# Patient Record
Sex: FEMALE | Race: WHITE | Employment: OTHER | URBAN - METROPOLITAN AREA
[De-identification: names, ages, dates, MRNs, and addresses within clinical notes are randomized per-mention and may not be internally consistent; named-entity substitution may affect disease eponyms.]

---

## 2017-08-08 PROBLEM — K12.1 MOUTH ULCERS: Status: ACTIVE | Noted: 2017-08-08

## 2017-12-29 PROBLEM — M41.9 KYPHOSCOLIOSIS DEFORMITY OF SPINE: Status: ACTIVE | Noted: 2017-12-29

## 2020-04-14 ENCOUNTER — HOSPITAL ENCOUNTER (OUTPATIENT)
Dept: CT IMAGING | Age: 85
Discharge: HOME | End: 2020-04-14
Attending: OTOLARYNGOLOGY
Payer: MEDICARE

## 2020-04-14 DIAGNOSIS — H74.8X2: ICD-10-CM

## 2020-04-14 DIAGNOSIS — H72.92 PERFORATION OF LEFT TYMPANIC MEMBRANE: ICD-10-CM

## 2020-04-14 LAB — CREAT BLD-MCNC: 1.3 MG/DL (ref 0.8–1.5)

## 2020-04-14 PROCEDURE — 82565 ASSAY OF CREATININE: CPT

## 2020-04-14 PROCEDURE — 74011636320 HC RX REV CODE- 636/320: Performed by: OTOLARYNGOLOGY

## 2020-04-14 PROCEDURE — 74011000258 HC RX REV CODE- 258: Performed by: OTOLARYNGOLOGY

## 2020-04-14 PROCEDURE — 70481 CT ORBIT/EAR/FOSSA W/DYE: CPT

## 2020-04-14 RX ORDER — SODIUM CHLORIDE 0.9 % (FLUSH) 0.9 %
10 SYRINGE (ML) INJECTION
Status: COMPLETED | OUTPATIENT
Start: 2020-04-14 | End: 2020-04-14

## 2020-04-14 RX ADMIN — IOPAMIDOL 100 ML: 755 INJECTION, SOLUTION INTRAVENOUS at 17:08

## 2020-04-14 RX ADMIN — SODIUM CHLORIDE 100 ML: 900 INJECTION, SOLUTION INTRAVENOUS at 17:08

## 2020-04-14 RX ADMIN — Medication 10 ML: at 17:08

## 2022-03-19 PROBLEM — M41.9 KYPHOSCOLIOSIS DEFORMITY OF SPINE: Status: ACTIVE | Noted: 2017-12-29

## 2022-03-19 PROBLEM — K12.1 MOUTH ULCERS: Status: ACTIVE | Noted: 2017-08-08

## 2022-05-27 ENCOUNTER — TELEPHONE (OUTPATIENT)
Dept: INTERNAL MEDICINE CLINIC | Facility: CLINIC | Age: 87
End: 2022-05-27

## 2022-05-27 RX ORDER — NITROFURANTOIN 25; 75 MG/1; MG/1
100 CAPSULE ORAL 2 TIMES DAILY
Qty: 6 CAPSULE | Refills: 0 | Status: SHIPPED | OUTPATIENT
Start: 2022-05-27 | End: 2022-05-30

## 2022-05-27 RX ORDER — MECLIZINE HYDROCHLORIDE 25 MG/1
25 TABLET ORAL 3 TIMES DAILY PRN
Qty: 90 TABLET | Refills: 0 | Status: SHIPPED | OUTPATIENT
Start: 2022-05-27 | End: 2022-08-22

## 2022-05-27 NOTE — TELEPHONE ENCOUNTER
Adrian Sharpe states she does not need an increase, just a refill. He states she is also running to the bathroom to urinate a lot. She does not complain of any burning, just frequency. She has appointment with you on 6/5/22. He is asking if you would also send in antibiotic for her for UTI.

## 2022-05-27 NOTE — TELEPHONE ENCOUNTER
Patient's son called, Rachel Johnson. Wants to know if he can get a refill on her Mecklizine and a increase in dosage .  Please call him [phone]

## 2022-05-27 NOTE — TELEPHONE ENCOUNTER
Sent in antibiotics based on results of her last culture. Eat yogurt every day while you are on antibiotics. Will check urine when she comes in next week.

## 2022-07-01 DIAGNOSIS — F41.9 ANXIETY: Primary | ICD-10-CM

## 2022-07-01 RX ORDER — LORAZEPAM 1 MG/1
TABLET ORAL
Qty: 60 TABLET | Refills: 3 | Status: SHIPPED | OUTPATIENT
Start: 2022-07-01 | End: 2022-08-01

## 2022-07-01 NOTE — TELEPHONE ENCOUNTER
Pt son Kym Valdovinos requested a refill on Lorazepam and wanted to know if the dosage could be increased so that it will last longer also, pt is having chronic headaches behind eye area.

## 2022-07-05 ENCOUNTER — OFFICE VISIT (OUTPATIENT)
Dept: INTERNAL MEDICINE CLINIC | Facility: CLINIC | Age: 87
End: 2022-07-05
Payer: MEDICARE

## 2022-07-05 VITALS
HEIGHT: 61 IN | OXYGEN SATURATION: 97 % | HEART RATE: 79 BPM | SYSTOLIC BLOOD PRESSURE: 132 MMHG | WEIGHT: 94.6 LBS | DIASTOLIC BLOOD PRESSURE: 76 MMHG | RESPIRATION RATE: 18 BRPM | BODY MASS INDEX: 17.86 KG/M2

## 2022-07-05 DIAGNOSIS — I10 PRIMARY HYPERTENSION: ICD-10-CM

## 2022-07-05 DIAGNOSIS — D51.9 ANEMIA DUE TO VITAMIN B12 DEFICIENCY, UNSPECIFIED B12 DEFICIENCY TYPE: Primary | ICD-10-CM

## 2022-07-05 DIAGNOSIS — R42 DIZZINESS: ICD-10-CM

## 2022-07-05 DIAGNOSIS — R32 URINARY INCONTINENCE, UNSPECIFIED TYPE: ICD-10-CM

## 2022-07-05 DIAGNOSIS — M15.9 OSTEOARTHRITIS OF MULTIPLE JOINTS, UNSPECIFIED OSTEOARTHRITIS TYPE: ICD-10-CM

## 2022-07-05 DIAGNOSIS — F41.9 ANXIETY: ICD-10-CM

## 2022-07-05 DIAGNOSIS — D51.9 ANEMIA DUE TO VITAMIN B12 DEFICIENCY, UNSPECIFIED B12 DEFICIENCY TYPE: ICD-10-CM

## 2022-07-05 DIAGNOSIS — M81.0 AGE-RELATED OSTEOPOROSIS WITHOUT CURRENT PATHOLOGICAL FRACTURE: ICD-10-CM

## 2022-07-05 DIAGNOSIS — M41.9 KYPHOSCOLIOSIS DEFORMITY OF SPINE: ICD-10-CM

## 2022-07-05 PROCEDURE — 1123F ACP DISCUSS/DSCN MKR DOCD: CPT | Performed by: INTERNAL MEDICINE

## 2022-07-05 PROCEDURE — G8419 CALC BMI OUT NRM PARAM NOF/U: HCPCS | Performed by: INTERNAL MEDICINE

## 2022-07-05 PROCEDURE — 0509F URINE INCON PLAN DOCD: CPT | Performed by: INTERNAL MEDICINE

## 2022-07-05 PROCEDURE — 1090F PRES/ABSN URINE INCON ASSESS: CPT | Performed by: INTERNAL MEDICINE

## 2022-07-05 PROCEDURE — 99214 OFFICE O/P EST MOD 30 MIN: CPT | Performed by: INTERNAL MEDICINE

## 2022-07-05 PROCEDURE — G8427 DOCREV CUR MEDS BY ELIG CLIN: HCPCS | Performed by: INTERNAL MEDICINE

## 2022-07-05 PROCEDURE — 1036F TOBACCO NON-USER: CPT | Performed by: INTERNAL MEDICINE

## 2022-07-05 SDOH — ECONOMIC STABILITY: FOOD INSECURITY: WITHIN THE PAST 12 MONTHS, YOU WORRIED THAT YOUR FOOD WOULD RUN OUT BEFORE YOU GOT MONEY TO BUY MORE.: PATIENT DECLINED

## 2022-07-05 SDOH — ECONOMIC STABILITY: FOOD INSECURITY: WITHIN THE PAST 12 MONTHS, THE FOOD YOU BOUGHT JUST DIDN'T LAST AND YOU DIDN'T HAVE MONEY TO GET MORE.: PATIENT DECLINED

## 2022-07-05 ASSESSMENT — ENCOUNTER SYMPTOMS
CONSTIPATION: 1
BACK PAIN: 1
SHORTNESS OF BREATH: 0
COUGH: 0
NAUSEA: 0
WHEEZING: 0
DIARRHEA: 1

## 2022-07-05 ASSESSMENT — PATIENT HEALTH QUESTIONNAIRE - PHQ9
SUM OF ALL RESPONSES TO PHQ9 QUESTIONS 1 & 2: 0
SUM OF ALL RESPONSES TO PHQ QUESTIONS 1-9: 0
2. FEELING DOWN, DEPRESSED OR HOPELESS: 0
SUM OF ALL RESPONSES TO PHQ QUESTIONS 1-9: 0
1. LITTLE INTEREST OR PLEASURE IN DOING THINGS: 0
SUM OF ALL RESPONSES TO PHQ QUESTIONS 1-9: 0
SUM OF ALL RESPONSES TO PHQ QUESTIONS 1-9: 0

## 2022-07-05 ASSESSMENT — SOCIAL DETERMINANTS OF HEALTH (SDOH): HOW HARD IS IT FOR YOU TO PAY FOR THE VERY BASICS LIKE FOOD, HOUSING, MEDICAL CARE, AND HEATING?: PATIENT DECLINED

## 2022-07-05 NOTE — PROGRESS NOTES
7/5/2022 3:31 PM  Location:[facility] [address] [facility]  [place]  Patient #:  [id number]  YOB: 1925            History of Present Illness     Chief Complaint   Patient presents with    Follow-up    Headache     Complains with daily headaches    Extremity Weakness     Complains with weakness - not able to make it to the bathroom        Ms. Caroline Atwood is a 80 y.o. female  who presents for follow up on chronic medical problems. 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    Backache, unspecified 6/10/2015    Cancer ([facility]     Skin CA removed, x2    Cardiac dysrhythmia, unspecified     Compression fracture 6/10/2015    Hypertension     OA (osteoarthritis) 6/10/2015    Osteoporosis     Other vitamin B12 deficiency anemia     Pain in joint, shoulder region 6/10/2015    Sacroiliitis, not elsewhere classified ([facility] 6/10/2015    Torticollis, unspecified 6/10/2015    Underweight     Unspecified constipation 6/10/2015    Unspecified hereditary and idiopathic peripheral neuropathy 6/10/2015    UTI (urinary tract infection)     Vitamin D deficiency      Social History     Socioeconomic History    Marital status:       Spouse name: None    Number of children: None    Years of education: None    Highest education level: None   Occupational History    None   Tobacco Use    Smoking status: Never Smoker    Smokeless tobacco: Never Used   Substance and Sexual Activity    Alcohol use: No    Drug use: No    Sexual activity: None   Other Topics Concern    None   Social History Narrative    None     Social Determinants of Health     Financial Resource Strain: Unknown    Difficulty of Paying Living Expenses: Patient refused   Food Insecurity: Unknown    Worried About Running Out of Food in the Last Year: Patient refused   [address] in the Last Year: Patient refused   Transportation Needs:     Lack of Transportation (Medical): Not on file    Lack of Transportation (Non-Medical): Not on file   Physical Activity:     Days of Exercise per Week: Not on file    Minutes of Exercise per Session: Not on file   Stress:     Feeling of Stress : Not on file   Social Connections:     Frequency of Communication with Friends and Family: Not on file    Frequency of Social Gatherings with Friends and Family: Not on file    Attends Anabaptist Services: Not on file    Active Member of [address] [place] [address] or Organizations: Not on file    Attends Club or Organization Meetings: Not on file    Marital Status: Not on file   Intimate Partner Violence:     Fear of Current or Ex-Partner: Not on file    Emotionally Abused: Not on file    Physically Abused: Not on file    Sexually Abused: Not on file   Housing Stability:     Unable to Pay for Housing in the Last Year: Not on file    Number of [place] in the Last Year: Not on file    Unstable Housing in the Last Year: Not on file     Past Surgical History:   Procedure Laterality Date   [address] Bilateral 2004, 2005   [address] Right 05/30/14    Dr. Berniece Curling    HYSTERECTOMY ([facility])  1965    Vaginal- for fibroids- on HRT for only a short period of time.  MALIGNANT SKIN LESION EXCISION Left 1958    Leg- Melanoma    MALIGNANT SKIN LESION EXCISION Right 2008    TOTAL HIP ARTHROPLASTY Right 05/03/2014     Current Outpatient Medications   Medication Sig Dispense Refill    LORazepam (ATIVAN) 1 MG tablet TAKE 1 TABLET BY MOUTH TWICE DAILY AS NEEDED FOR ANXIETY.  MAX DAILY AMOUNT: 2 MG 60 tablet 3    meclizine (ANTIVERT) 25 MG tablet Take 1 tablet by mouth 3 times daily as needed for Dizziness 90 tablet 0    acetaminophen (TYLENOL) 500 MG tablet Take 1,000 mg by mouth 2 times daily as needed      vitamin D (CHOLECALCIFEROL) 15164 UNIT CAPS Take 50,000 Units by mouth daily      cyanocobalamin 1000 MCG tablet Take 1,000 mcg by mouth daily      famotidine (PEPCID) 20 MG tablet 1 every day - twice daily prn reflux      fluticasone (FLONASE) 50 MCG/ACT nasal spray 2 sprays by Nasal route daily      polyethylene glycol (GLYCOLAX) 17 GM/SCOOP powder Take 17 g by mouth daily as needed      senna (SENOKOT) 8.6 MG tablet Take 8.6 mg by mouth 2 times daily       No current facility-administered medications for this visit. Health Maintenance   Topic Date Due    Annual Wellness Visit (AWV)  Never done    COVID-19 Vaccine (1) Never done    Shingles vaccine (1 of 2) Never done    Depression Screen  04/21/2022    Flu vaccine (1) 09/01/2022    DEXA (modify frequency per FRAX score)  04/30/2024    DTaP/Tdap/Td vaccine (2 - Td or Tdap) 10/27/2027    Pneumococcal 65+ years Vaccine  Completed    Hepatitis A vaccine  Aged Out    Hepatitis B vaccine  Aged Out    Hib vaccine  Aged Out    Meningococcal (ACWY) vaccine  Aged Out     Family History   Problem Relation Age of Onset    Hypertension Sister     Stroke Sister     Heart Disease Father     Stroke Mother     Stroke Brother     Diabetes Child     Diabetes Child     Cancer Son         Prostate    High Cholesterol Sister              Review of Systems  Review of Systems   Constitutional: Positive for fatigue. Negative for chills and fever. HENT: Positive for hearing loss (reduced by another 20%). Eyes: Positive for visual disturbance (son notes that she can't see well). Respiratory: Negative for cough, shortness of breath and wheezing. Cardiovascular: Negative for chest pain, palpitations and leg swelling. Gastrointestinal: Positive for constipation and diarrhea. Negative for nausea. Genitourinary: Negative for difficulty urinating, dysuria and hematuria.         Urinary incontinence Musculoskeletal: Positive for back pain. Neurological: Positive for weakness and headaches (frontal; comes and goes). Negative for numbness. Psychiatric/Behavioral: The patient is not nervous/anxious. /76 (Site: Right Upper Arm, Position: Sitting, Cuff Size: Medium Adult)   Pulse 79   Resp 18   Ht 5' 1\" (1.549 m)   Wt 94 lb 9.6 oz (42.9 kg)   SpO2 97%   BMI 17.87 kg/m²       Physical Exam    Physical Exam  Constitutional:       Appearance: Normal appearance. She is not ill-appearing. HENT:      Head: Normocephalic. Neck:      Vascular: No carotid bruit. Cardiovascular:      Rate and Rhythm: Normal rate and regular rhythm. Pulmonary:      Effort: Pulmonary effort is normal.      Breath sounds: Normal breath sounds. No wheezing. Abdominal:      General: Abdomen is flat. Bowel sounds are increased. Palpations: Abdomen is soft. Tenderness: There is no abdominal tenderness. There is no right CVA tenderness or left CVA tenderness. Musculoskeletal:      Cervical back: No tenderness. Thoracic back: Deformity (kyphoscoliosis) present. Lumbar back: Deformity present. Right lower leg: Edema (trace) present. Left lower leg: Edema (trace) present. Neurological:      Mental Status: She is alert. Gait: Gait abnormal (using a walker). Deep Tendon Reflexes:      Reflex Scores:       Patellar reflexes are 2+ on the right side and 2+ on the left side. Psychiatric:         Mood and Affect: Mood normal.         Behavior: Behavior normal.      Comments: Smiling and interactive           Assessment & Plan    Current Outpatient Medications   Medication Sig Dispense Refill    LORazepam (ATIVAN) 1 MG tablet TAKE 1 TABLET BY MOUTH TWICE DAILY AS NEEDED FOR ANXIETY.  MAX DAILY AMOUNT: 2 MG 60 tablet 3    meclizine (ANTIVERT) 25 MG tablet Take 1 tablet by mouth 3 times daily as needed for Dizziness 90 tablet 0    acetaminophen (TYLENOL) 500 MG tablet Take 1,000 mg symptoms. Follow up as documented or earlier as needed. Return in about 6 months (around 1/5/2023).           King Gumaro MD

## 2022-07-06 ENCOUNTER — TELEPHONE (OUTPATIENT)
Dept: INTERNAL MEDICINE CLINIC | Facility: CLINIC | Age: 87
End: 2022-07-06

## 2022-07-06 LAB
ALBUMIN SERPL-MCNC: 4 G/DL (ref 3.2–4.6)
ALBUMIN/GLOB SERPL: 1.3 {RATIO} (ref 1.2–3.5)
ALP SERPL-CCNC: 88 U/L (ref 50–136)
ALT SERPL-CCNC: 28 U/L (ref 12–65)
ANION GAP SERPL CALC-SCNC: 4 MMOL/L (ref 7–16)
AST SERPL-CCNC: 33 U/L (ref 15–37)
BASOPHILS # BLD: 0 K/UL (ref 0–0.2)
BASOPHILS NFR BLD: 1 % (ref 0–2)
BILIRUB SERPL-MCNC: 0.9 MG/DL (ref 0.2–1.1)
BUN SERPL-MCNC: 26 MG/DL (ref 8–23)
CALCIUM SERPL-MCNC: 10.1 MG/DL (ref 8.3–10.4)
CHLORIDE SERPL-SCNC: 101 MMOL/L (ref 98–107)
CO2 SERPL-SCNC: 32 MMOL/L (ref 21–32)
CREAT SERPL-MCNC: 1.2 MG/DL (ref 0.6–1)
DIFFERENTIAL METHOD BLD: ABNORMAL
EOSINOPHIL # BLD: 0.1 K/UL (ref 0–0.8)
EOSINOPHIL NFR BLD: 1 % (ref 0.5–7.8)
ERYTHROCYTE [DISTWIDTH] IN BLOOD BY AUTOMATED COUNT: 13 % (ref 11.9–14.6)
GLOBULIN SER CALC-MCNC: 3.1 G/DL (ref 2.3–3.5)
GLUCOSE SERPL-MCNC: 124 MG/DL (ref 65–100)
HCT VFR BLD AUTO: 37.8 % (ref 35.8–46.3)
HGB BLD-MCNC: 12.1 G/DL (ref 11.7–15.4)
IMM GRANULOCYTES # BLD AUTO: 0 K/UL (ref 0–0.5)
IMM GRANULOCYTES NFR BLD AUTO: 0 % (ref 0–5)
LYMPHOCYTES # BLD: 0.9 K/UL (ref 0.5–4.6)
LYMPHOCYTES NFR BLD: 17 % (ref 13–44)
MCH RBC QN AUTO: 32.1 PG (ref 26.1–32.9)
MCHC RBC AUTO-ENTMCNC: 32 G/DL (ref 31.4–35)
MCV RBC AUTO: 100.3 FL (ref 79.6–97.8)
MONOCYTES # BLD: 0.4 K/UL (ref 0.1–1.3)
MONOCYTES NFR BLD: 8 % (ref 4–12)
NEUTS SEG # BLD: 4.1 K/UL (ref 1.7–8.2)
NEUTS SEG NFR BLD: 73 % (ref 43–78)
NRBC # BLD: 0 K/UL (ref 0–0.2)
PLATELET # BLD AUTO: 164 K/UL (ref 150–450)
PMV BLD AUTO: 11.8 FL (ref 9.4–12.3)
POTASSIUM SERPL-SCNC: 4.2 MMOL/L (ref 3.5–5.1)
PROT SERPL-MCNC: 7.1 G/DL (ref 6.3–8.2)
RBC # BLD AUTO: 3.77 M/UL (ref 4.05–5.2)
SODIUM SERPL-SCNC: 137 MMOL/L (ref 136–145)
TSH W FREE THYROID IF ABNORMAL: 2.46 UIU/ML (ref 0.36–3.74)
WBC # BLD AUTO: 5.5 K/UL (ref 4.3–11.1)

## 2022-07-06 NOTE — TELEPHONE ENCOUNTER
----- Message from Malia Issa MD sent at 7/6/2022 12:31 PM EDT -----  Your labs show that you are mildly dehydrated. Please make sure you are drinking plenty of liquids. Otherwise, they are unremarkable. I hope you are doing okay. Thanks.   Dorcas Gandhi 33

## 2022-07-06 NOTE — RESULT ENCOUNTER NOTE
Your labs show that you are mildly dehydrated. Please make sure you are drinking plenty of liquids. Otherwise, they are unremarkable. I hope you are doing okay. Thanks.   Dorcas Covarrubias

## 2022-08-22 RX ORDER — MECLIZINE HYDROCHLORIDE 25 MG/1
TABLET ORAL
Qty: 90 TABLET | Refills: 3 | Status: SHIPPED | OUTPATIENT
Start: 2022-08-22

## 2022-11-03 ENCOUNTER — OFFICE VISIT (OUTPATIENT)
Dept: INTERNAL MEDICINE CLINIC | Facility: CLINIC | Age: 87
End: 2022-11-03
Payer: MEDICARE

## 2022-11-03 VITALS
OXYGEN SATURATION: 100 % | HEIGHT: 64 IN | BODY MASS INDEX: 16.05 KG/M2 | SYSTOLIC BLOOD PRESSURE: 136 MMHG | TEMPERATURE: 97.5 F | WEIGHT: 94 LBS | DIASTOLIC BLOOD PRESSURE: 70 MMHG | HEART RATE: 73 BPM | RESPIRATION RATE: 17 BRPM
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
## 2022-11-03 DIAGNOSIS — R35.0 URINARY FREQUENCY: ICD-10-CM

## 2022-11-03 PROBLEM — N18.30 CHRONIC RENAL DISEASE, STAGE III (HCC): Status: ACTIVE | Noted: 2022-11-03

## 2022-11-03 PROCEDURE — 99214 OFFICE O/P EST MOD 30 MIN: CPT | Performed by: NURSE PRACTITIONER

## 2022-11-03 PROCEDURE — G8484 FLU IMMUNIZE NO ADMIN: HCPCS | Performed by: NURSE PRACTITIONER

## 2022-11-03 PROCEDURE — 1036F TOBACCO NON-USER: CPT | Performed by: NURSE PRACTITIONER

## 2022-11-03 PROCEDURE — 1123F ACP DISCUSS/DSCN MKR DOCD: CPT | Performed by: NURSE PRACTITIONER

## 2022-11-03 PROCEDURE — 1090F PRES/ABSN URINE INCON ASSESS: CPT | Performed by: NURSE PRACTITIONER

## 2022-11-03 PROCEDURE — G8419 CALC BMI OUT NRM PARAM NOF/U: HCPCS | Performed by: NURSE PRACTITIONER

## 2022-11-03 PROCEDURE — G8427 DOCREV CUR MEDS BY ELIG CLIN: HCPCS | Performed by: NURSE PRACTITIONER

## 2022-11-03 RX ORDER — PHENOL 1.4 %
1 AEROSOL, SPRAY (ML) MUCOUS MEMBRANE DAILY
COMMUNITY

## 2022-11-03 ASSESSMENT — ENCOUNTER SYMPTOMS
SHORTNESS OF BREATH: 0
VOMITING: 0
NAUSEA: 0

## 2022-11-03 NOTE — PROGRESS NOTES
11/3/2022 10:30 AM  Location:[facility] [address] [facility]  [place]  Patient #:  [id number]  YOB: 1925          YOUR LAST HEMOGLOBIN A1CS:   No results found for: HBA1C, [id number]    YOUR LAST LIPID PROFILE:   Lab Results   Component Value Date/Time    CHOL 211 10/27/2020 11:02 AM    HDL 79 10/27/2020 11:02 AM    VLDL 15 10/27/2020 11:02 AM         Lab Results   Component Value Date/Time    GFRAA 53 07/05/2022 03:45 PM    BUN 26 07/05/2022 03:45 PM     07/05/2022 03:45 PM    K 4.2 07/05/2022 03:45 PM     07/05/2022 03:45 PM    CO2 32 07/05/2022 03:45 PM           History of Present Illness     Chief Complaint   Patient presents with    Chidi Riojas in kitchen striking right arm and head. 11/2/22. Mari. Ms. Eugenio Boggs is a 80 y.o. female  who presents for follow up after fall 6 days ago and ER visit. Ms. Ronny Rivas presents with her son this morning for follow-up after a recent fall. She apparently lost her balance last Friday trying to hold her walker and fell onto a cabinet, striking her right arm and chest.  Her son whom she lives with did not witness it but reports that he does not think she had any loss of consciousness and that she struck the cabinet with her right side. He does not think she hit her head but did not directly witness it so is unsure. She is not on any oral anticoagulation. She does have memory loss which has gradually been worsening over the past 2 to 3 months but he notes since the fall no acute worsening confusion, nausea or vomiting. Yesterday she reported some right-sided chest pain and so he took her to the emergency department. There she had an EKG, chest x-ray and CT to rule out aortic dissection as well as negative troponins, normal CMP and CBC. She was discharged home. Her son notes that she is not acutely more confused but gradually has been having some hallucinations over the past 2 months.   She has lost about 24 pounds over the past year and a half. He worries  that she is getting close to dying. At  the last office visit we discussed a referral for palliative care but he continues to decline any further assistance at this time. He feels that he is managing her care at home. He also has a brother who contributes to her care. They have not really discussed end of life care or treatment. Allergies   Allergen Reactions    Levofloxacin Nausea Only and Rash     Past Medical History:   Diagnosis Date    Arthritis     Asymptomatic postmenopausal status (age-related) (natural)     Atrophy of vulva 6/10/2015    Backache, unspecified 6/10/2015    Cancer ([facility]     Skin CA removed, x2    Cardiac dysrhythmia, unspecified     Compression fracture 6/10/2015    Hypertension     OA (osteoarthritis) 6/10/2015    Osteoporosis     Other vitamin B12 deficiency anemia     Pain in joint, shoulder region 6/10/2015    Sacroiliitis, not elsewhere classified ([facility] 6/10/2015    Torticollis, unspecified 6/10/2015    Underweight     Unspecified constipation 6/10/2015    Unspecified hereditary and idiopathic peripheral neuropathy 6/10/2015    UTI (urinary tract infection)     Vitamin D deficiency      Social History     Socioeconomic History    Marital status:       Spouse name: None    Number of children: None    Years of education: None    Highest education level: None   Tobacco Use    Smoking status: Never    Smokeless tobacco: Never   Substance and Sexual Activity    Alcohol use: No    Drug use: No     Social Determinants of Health     Financial Resource Strain: Unknown    Difficulty of Paying Living Expenses: Patient refused   Food Insecurity: Unknown    Worried About Running Out of Food in the Last Year: Patient refused    Ran Out of Food in the Last Year: Patient refused     Past Surgical History:   Procedure Laterality Date    APPENDECTOMY  1945    CATARACT REMOVAL Bilateral 2004, 2005    [address] SURGERY Right 05/30/14    Dr. Prashanth Varags    HYSTERECTOMY ([facility])  1965    Vaginal- for fibroids- on HRT for only a short period of time. MALIGNANT SKIN LESION EXCISION Left 1958    Leg- Melanoma    MALIGNANT SKIN LESION EXCISION Right 2008    TOTAL HIP ARTHROPLASTY Right 05/03/2014     Current Outpatient Medications   Medication Sig Dispense Refill    calcium carbonate 600 MG TABS tablet Take 1 tablet by mouth daily      meclizine (ANTIVERT) 25 MG tablet TAKE 1 TABLET BY MOUTH THREE TIMES DAILY AS NEEDED FOR DIZZINESS 90 tablet 3    acetaminophen (TYLENOL) 500 MG tablet Take 1,000 mg by mouth 2 times daily as needed      vitamin D (CHOLECALCIFEROL) 28899 UNIT CAPS Take 50,000 Units by mouth daily      cyanocobalamin 1000 MCG tablet Take 1,000 mcg by mouth daily      famotidine (PEPCID) 20 MG tablet 1 every day - twice daily prn reflux      fluticasone (FLONASE) 50 MCG/ACT nasal spray 2 sprays by Nasal route daily      polyethylene glycol (GLYCOLAX) 17 GM/SCOOP powder Take 17 g by mouth daily as needed      senna (SENOKOT) 8.6 MG tablet Take 8.6 mg by mouth 2 times daily       No current facility-administered medications for this visit.      Health Maintenance   Topic Date Due    COVID-19 Vaccine (1) Never done    Shingles vaccine (1 of 2) Never done    DEXA (modify frequency per FRAX score)  09/25/2020    Annual Wellness Visit (AWV)  Never done    Flu vaccine (1) 08/01/2022    Depression Screen  07/05/2023    DTaP/Tdap/Td vaccine (2 - Td or Tdap) 10/27/2027    Pneumococcal 65+ years Vaccine  Completed    Hepatitis A vaccine  Aged Out    Hib vaccine  Aged Out    Meningococcal (ACWY) vaccine  Aged Out     Family History   Problem Relation Age of Onset    Hypertension Sister     Stroke Sister     Heart Disease Father     Stroke Mother     Stroke Brother     Diabetes Child     Diabetes Child     Cancer Son         Prostate    High Cholesterol Sister              Review of Systems  Review of Systems Constitutional:  Negative for chills and fever. Respiratory:  Negative for shortness of breath. Cardiovascular:  Negative for chest pain, palpitations and leg swelling. Gastrointestinal:  Negative for nausea and vomiting. Endocrine: Positive for cold intolerance. Genitourinary:  Positive for frequency. Musculoskeletal:  Positive for arthralgias and gait problem. Psychiatric/Behavioral:  Positive for confusion (has dementia, not been more confused since the fall, but he notes increase in confusion over the past several months) and hallucinations (over the past several months). All other systems reviewed and are negative. /70 (Site: Left Upper Arm, Position: Sitting, Cuff Size: Medium Adult)   Pulse 73   Temp 97.5 °F (36.4 °C) (Skin)   Resp 17   Ht 5' 3.5\" (1.613 m)   Wt 94 lb (42.6 kg)   SpO2 100%   BMI 16.39 kg/m²       Physical Exam    Physical Exam  Vitals and nursing note reviewed. Constitutional:       General: She is not in acute distress. Appearance: She is ill-appearing (thin,kyphotic,  smiling and interactive). She is not toxic-appearing or diaphoretic. HENT:      Mouth/Throat:      Mouth: Mucous membranes are moist.   Eyes:      Pupils: Pupils are equal, round, and reactive to light. Cardiovascular:      Rate and Rhythm: Regular rhythm. Pulmonary:      Effort: No respiratory distress. Breath sounds: No wheezing, rhonchi or rales. Musculoskeletal:      Cervical back: Normal range of motion. No rigidity. No spinous process tenderness or muscular tenderness. Normal range of motion. Right lower leg: No edema. Left lower leg: No edema. Neurological:      Mental Status: Mental status is at baseline. She is confused. GCS: GCS eye subscore is 4. GCS verbal subscore is 4. GCS motor subscore is 6. Cranial Nerves: Cranial nerves 2-12 are intact. Sensory: Sensation is intact. Motor: Motor function is intact.       Gait: Gait abnormal (slow, unsteady with walker). Psychiatric:         Cognition and Memory: Cognition is impaired. Memory is impaired. Assessment & Plan         Current Outpatient Medications   Medication Sig Dispense Refill    calcium carbonate 600 MG TABS tablet Take 1 tablet by mouth daily      meclizine (ANTIVERT) 25 MG tablet TAKE 1 TABLET BY MOUTH THREE TIMES DAILY AS NEEDED FOR DIZZINESS 90 tablet 3    acetaminophen (TYLENOL) 500 MG tablet Take 1,000 mg by mouth 2 times daily as needed      vitamin D (CHOLECALCIFEROL) 73830 UNIT CAPS Take 50,000 Units by mouth daily      cyanocobalamin 1000 MCG tablet Take 1,000 mcg by mouth daily      famotidine (PEPCID) 20 MG tablet 1 every day - twice daily prn reflux      fluticasone (FLONASE) 50 MCG/ACT nasal spray 2 sprays by Nasal route daily      polyethylene glycol (GLYCOLAX) 17 GM/SCOOP powder Take 17 g by mouth daily as needed      senna (SENOKOT) 8.6 MG tablet Take 8.6 mg by mouth 2 times daily       No current facility-administered medications for this visit. 1. Fall, initial encounter  We discussed obtaining head and neck CT but as the fall occurred over 6 days ago with no acute changes in her mental status, after long discussion her son defers. He will watch her for any changes and she did she have any acute mental status changes or acute new or worsening symptoms he will take her to be reevaluated. Today she is at her baseline mental status but he does report some decline over the past several months. We will rule out urinary tract infection. Labs reviewed from her ER visit yesterday, chest x-ray, CT angio of her chest and EKG are all reassuring. We will make sure she has close follow-up. We will continue to offer additional assistance with palliative care if needed. He continues to decline at this point. 2. Urinary frequency  - AMB POC URINALYSIS DIP STICK AUTO W/O MICRO  - Culture, Urine;  Future    Time spent in counseling and coordination of care today, including review of all records, was 31 minutes, more than half of which was face to face.       Pawel Goldberg NP, APRN - CNP

## 2022-11-14 DIAGNOSIS — R35.0 URINARY FREQUENCY: ICD-10-CM

## 2022-11-17 ENCOUNTER — TELEPHONE (OUTPATIENT)
Dept: INTERNAL MEDICINE CLINIC | Facility: CLINIC | Age: 87
End: 2022-11-17

## 2022-11-17 DIAGNOSIS — N30.00 ACUTE CYSTITIS WITHOUT HEMATURIA: Primary | ICD-10-CM

## 2022-11-17 LAB
BACTERIA SPEC CULT: ABNORMAL
BACTERIA SPEC CULT: ABNORMAL
SERVICE CMNT-IMP: ABNORMAL

## 2022-11-17 RX ORDER — CEPHALEXIN 500 MG/1
500 CAPSULE ORAL 2 TIMES DAILY
Qty: 10 CAPSULE | Refills: 0 | Status: SHIPPED | OUTPATIENT
Start: 2022-11-17 | End: 2022-11-22

## 2022-11-17 NOTE — TELEPHONE ENCOUNTER
Ms. Courtney Macetzer-  The urine culture did grow bacteria. I think we should treat her for a UTI with her recent fall. I have called in an antibiotic for her to begin. Please let us know if she has any new or worsening symptoms. Have her follow-up in 2 weeks to reassess her symptoms and see how she is doing with me. Thanks!   Stephani

## 2022-12-02 ENCOUNTER — TELEPHONE (OUTPATIENT)
Dept: INTERNAL MEDICINE CLINIC | Facility: CLINIC | Age: 87
End: 2022-12-02

## 2022-12-05 ENCOUNTER — OFFICE VISIT (OUTPATIENT)
Dept: INTERNAL MEDICINE CLINIC | Facility: CLINIC | Age: 87
End: 2022-12-05
Payer: MEDICARE

## 2022-12-05 VITALS
HEART RATE: 63 BPM | HEIGHT: 64 IN | WEIGHT: 94 LBS | SYSTOLIC BLOOD PRESSURE: 138 MMHG | RESPIRATION RATE: 17 BRPM | BODY MASS INDEX: 16.05 KG/M2 | OXYGEN SATURATION: 96 % | TEMPERATURE: 97.5 F | DIASTOLIC BLOOD PRESSURE: 70 MMHG

## 2022-12-05 DIAGNOSIS — R53.1 WEAKNESS: Primary | ICD-10-CM

## 2022-12-05 DIAGNOSIS — R03.0 ELEVATED BLOOD-PRESSURE READING WITHOUT DIAGNOSIS OF HYPERTENSION: ICD-10-CM

## 2022-12-05 DIAGNOSIS — R35.0 URINARY FREQUENCY: ICD-10-CM

## 2022-12-05 LAB
ANION GAP SERPL CALC-SCNC: 6 MMOL/L (ref 2–11)
BASOPHILS # BLD: 0 K/UL (ref 0–0.2)
BASOPHILS NFR BLD: 1 % (ref 0–2)
BUN SERPL-MCNC: 29 MG/DL (ref 8–23)
CALCIUM SERPL-MCNC: 10 MG/DL (ref 8.3–10.4)
CHLORIDE SERPL-SCNC: 105 MMOL/L (ref 101–110)
CO2 SERPL-SCNC: 27 MMOL/L (ref 21–32)
CREAT SERPL-MCNC: 1 MG/DL (ref 0.6–1)
DIFFERENTIAL METHOD BLD: ABNORMAL
EOSINOPHIL # BLD: 0.1 K/UL (ref 0–0.8)
EOSINOPHIL NFR BLD: 2 % (ref 0.5–7.8)
ERYTHROCYTE [DISTWIDTH] IN BLOOD BY AUTOMATED COUNT: 12.8 % (ref 11.9–14.6)
GLUCOSE SERPL-MCNC: 119 MG/DL (ref 65–100)
HCT VFR BLD AUTO: 40.5 % (ref 35.8–46.3)
HGB BLD-MCNC: 13.2 G/DL (ref 11.7–15.4)
IMM GRANULOCYTES # BLD AUTO: 0 K/UL (ref 0–0.5)
IMM GRANULOCYTES NFR BLD AUTO: 0 % (ref 0–5)
LYMPHOCYTES # BLD: 1 K/UL (ref 0.5–4.6)
LYMPHOCYTES NFR BLD: 28 % (ref 13–44)
MCH RBC QN AUTO: 32.9 PG (ref 26.1–32.9)
MCHC RBC AUTO-ENTMCNC: 32.6 G/DL (ref 31.4–35)
MCV RBC AUTO: 101 FL (ref 82–102)
MONOCYTES # BLD: 0.3 K/UL (ref 0.1–1.3)
MONOCYTES NFR BLD: 8 % (ref 4–12)
NEUTS SEG # BLD: 2.2 K/UL (ref 1.7–8.2)
NEUTS SEG NFR BLD: 61 % (ref 43–78)
NRBC # BLD: 0 K/UL (ref 0–0.2)
PLATELET # BLD AUTO: 159 K/UL (ref 150–450)
PMV BLD AUTO: 11.6 FL (ref 9.4–12.3)
POTASSIUM SERPL-SCNC: 4.2 MMOL/L (ref 3.5–5.1)
RBC # BLD AUTO: 4.01 M/UL (ref 4.05–5.2)
SODIUM SERPL-SCNC: 138 MMOL/L (ref 133–143)
WBC # BLD AUTO: 3.6 K/UL (ref 4.3–11.1)

## 2022-12-05 PROCEDURE — 1090F PRES/ABSN URINE INCON ASSESS: CPT | Performed by: NURSE PRACTITIONER

## 2022-12-05 PROCEDURE — G8427 DOCREV CUR MEDS BY ELIG CLIN: HCPCS | Performed by: NURSE PRACTITIONER

## 2022-12-05 PROCEDURE — 99214 OFFICE O/P EST MOD 30 MIN: CPT | Performed by: NURSE PRACTITIONER

## 2022-12-05 PROCEDURE — G8484 FLU IMMUNIZE NO ADMIN: HCPCS | Performed by: NURSE PRACTITIONER

## 2022-12-05 PROCEDURE — G8419 CALC BMI OUT NRM PARAM NOF/U: HCPCS | Performed by: NURSE PRACTITIONER

## 2022-12-05 PROCEDURE — 1123F ACP DISCUSS/DSCN MKR DOCD: CPT | Performed by: NURSE PRACTITIONER

## 2022-12-05 PROCEDURE — 1036F TOBACCO NON-USER: CPT | Performed by: NURSE PRACTITIONER

## 2022-12-05 PROCEDURE — 81003 URINALYSIS AUTO W/O SCOPE: CPT | Performed by: NURSE PRACTITIONER

## 2022-12-05 ASSESSMENT — ENCOUNTER SYMPTOMS
NAUSEA: 0
SHORTNESS OF BREATH: 0
VOMITING: 0
CONSTIPATION: 0
DIARRHEA: 0

## 2022-12-05 NOTE — PROGRESS NOTES
12/5/2022 9:54 AM  Location:[facility] [address] INTERNAL MEDICINE  [place]  Patient #:  [id number]  YOB: 1925          YOUR LAST HEMOGLOBIN A1CS:   No results found for: HBA1C, [id number]    YOUR LAST LIPID PROFILE:   Lab Results   Component Value Date/Time    CHOL 211 10/27/2020 11:02 AM    HDL 79 10/27/2020 11:02 AM    VLDL 15 10/27/2020 11:02 AM         Lab Results   Component Value Date/Time    GFRAA 53 07/05/2022 03:45 PM    BUN 26 07/05/2022 03:45 PM     07/05/2022 03:45 PM    K 4.2 07/05/2022 03:45 PM     07/05/2022 03:45 PM    CO2 32 07/05/2022 03:45 PM           History of Present Illness     Chief Complaint   Patient presents with    Urinary Tract Infection     Follow up uti. Urinary frequency. Ms. Med Brown is a 80 y.o. female  who presents for follow up. Ms. Selene Oviedo presents with her son for follow-up. She was seen the beginning of November after she had a fall, was a post ER follow-up. She has not had any further falls. A urine sample collected at that office visit grew E. coli for which she was prescribed Keflex. She continues living at home, both of her sons live with her. She uses a walker for ambulation. Her son notes that her appetite has been well and that her confusion is stable. He states that she will intermittently talk about things in the past and that she has gotten more hard of hearing. She had an eye appointment last week and the doctor said her eyes were worsening. Ms. Med Brown has not been complaining of any chest pain, shortness of breath or abdominal pain. She is incontinent of urine and does wear briefs. Her son notes that her bowels have been moving normally. She has no new complaints today. She is scheduled for routine checkup with her primary doctor, Dr. Bj Peterson, next month.          Allergies   Allergen Reactions    Levofloxacin Nausea Only and Rash     Past Medical History:   Diagnosis Date    Arthritis     Asymptomatic postmenopausal status (age-related) (natural)     Atrophy of vulva 6/10/2015    Backache, unspecified 6/10/2015    Cancer ([facility] [address]     Skin CA removed, x2    Cardiac dysrhythmia, unspecified     Compression fracture 6/10/2015    Hypertension     OA (osteoarthritis) 6/10/2015    Osteoporosis     Other vitamin B12 deficiency anemia     Pain in joint, shoulder region 6/10/2015    Sacroiliitis, not elsewhere classified ([facility] [address] 6/10/2015    Torticollis, unspecified 6/10/2015    Underweight     Unspecified constipation 6/10/2015    Unspecified hereditary and idiopathic peripheral neuropathy 6/10/2015    UTI (urinary tract infection)     Vitamin D deficiency      Social History     Socioeconomic History    Marital status:      Spouse name: None    Number of children: None    Years of education: None    Highest education level: None   Tobacco Use    Smoking status: Never    Smokeless tobacco: Never   Substance and Sexual Activity    Alcohol use: No    Drug use: No     Social Determinants of Health     Financial Resource Strain: Unknown    Difficulty of Paying Living Expenses: Patient refused   Food Insecurity: Unknown    Worried About Running Out of Food in the Last Year: Patient refused    Ran Out of Food in the Last Year: Patient refused     Past Surgical History:   Procedure Laterality Date    [address] Bilateral 2004, 2005    [address] Right 05/30/14    Dr. Adriana Dawson    HYSTERECTOMY ([facility])  1965    Vaginal- for fibroids- on HRT for only a short period of time.     MALIGNANT SKIN LESION EXCISION Left 1958    Leg- Melanoma    MALIGNANT SKIN LESION EXCISION Right 2008    TOTAL HIP ARTHROPLASTY Right 05/03/2014     Current Outpatient Medications   Medication Sig Dispense Refill    calcium carbonate 600 MG TABS tablet Take 1 tablet by mouth daily      meclizine (ANTIVERT) 25 MG tablet TAKE 1 TABLET BY MOUTH THREE TIMES DAILY AS NEEDED FOR DIZZINESS 90 tablet 3    acetaminophen (TYLENOL) 500 MG tablet Take 1,000 mg by mouth 2 times daily as needed      vitamin D (CHOLECALCIFEROL) 09784 UNIT CAPS Take 50,000 Units by mouth daily      cyanocobalamin 1000 MCG tablet Take 1,000 mcg by mouth daily      famotidine (PEPCID) 20 MG tablet 1 every day - twice daily prn reflux      fluticasone (FLONASE) 50 MCG/ACT nasal spray 2 sprays by Nasal route daily      polyethylene glycol (GLYCOLAX) 17 GM/SCOOP powder Take 17 g by mouth daily as needed      senna (SENOKOT) 8.6 MG tablet Take 8.6 mg by mouth 2 times daily       No current facility-administered medications for this visit. Health Maintenance   Topic Date Due    COVID-19 Vaccine (1) Never done    Shingles vaccine (1 of 2) Never done    DEXA (modify frequency per FRAX score)  09/25/2020    Annual Wellness Visit (AWV)  Never done    Flu vaccine (1) 08/01/2022    Depression Screen  07/05/2023    DTaP/Tdap/Td vaccine (2 - Td or Tdap) 10/27/2027    Pneumococcal 65+ years Vaccine  Completed    Hepatitis A vaccine  Aged Out    Hib vaccine  Aged Out    Meningococcal (ACWY) vaccine  Aged Out     Family History   Problem Relation Age of Onset    Hypertension Sister     Stroke Sister     Heart Disease Father     Stroke Mother     Stroke Brother     Diabetes Child     Diabetes Child     Cancer Son         Prostate    High Cholesterol Sister              Review of Systems  Review of Systems   Constitutional:  Positive for fatigue. Negative for appetite change, chills and fever. HENT:  Positive for hearing loss. Respiratory:  Negative for shortness of breath. Cardiovascular:  Negative for chest pain, palpitations and leg swelling. Gastrointestinal:  Negative for constipation, diarrhea, nausea and vomiting. Musculoskeletal:  Positive for gait problem (using a walker). Neurological:  Positive for headaches (for several days). Negative for syncope.    Psychiatric/Behavioral:  Positive for confusion (has been more confused lately. ). Memory worsening   All other systems reviewed and are negative. BP (!) 186/103 (Site: Left Upper Arm, Position: Sitting, Cuff Size: Large Adult)   Pulse 63   Temp 97.5 °F (36.4 °C) (Skin)   Resp 17   Ht 5' 3.5\" (1.613 m)   Wt 94 lb (42.6 kg)   SpO2 96%   BMI 16.39 kg/m²   Recheck /70    Physical Exam    Physical Exam  Vitals and nursing note reviewed. Constitutional:       General: She is not in acute distress. Appearance: She is not ill-appearing ([place], alert, sitting in walker), toxic-appearing or diaphoretic. HENT:      Mouth/Throat:      Mouth: Mucous membranes are moist.   Neck:      Vascular: No carotid bruit. Cardiovascular:      Rate and Rhythm: Regular rhythm. Pulmonary:      Effort: No respiratory distress. Breath sounds: No wheezing, rhonchi or rales. Abdominal:      Palpations: Abdomen is soft. Tenderness: There is no abdominal tenderness. Comments: Wearing a brief   Musculoskeletal:      Right lower leg: No edema. Left lower leg: No edema. Skin:     Coloration: Skin is pale. Neurological:      Mental Status: She is oriented to person, place, and time. Gait: Gait abnormal (slow, unsteady with walker).          Assessment & Plan         Current Outpatient Medications   Medication Sig Dispense Refill    calcium carbonate 600 MG TABS tablet Take 1 tablet by mouth daily      meclizine (ANTIVERT) 25 MG tablet TAKE 1 TABLET BY MOUTH THREE TIMES DAILY AS NEEDED FOR DIZZINESS 90 tablet 3    acetaminophen (TYLENOL) 500 MG tablet Take 1,000 mg by mouth 2 times daily as needed      vitamin D (CHOLECALCIFEROL) 34769 UNIT CAPS Take 50,000 Units by mouth daily      cyanocobalamin 1000 MCG tablet Take 1,000 mcg by mouth daily      famotidine (PEPCID) 20 MG tablet 1 every day - twice daily prn reflux      fluticasone (FLONASE) 50 MCG/ACT nasal spray 2 sprays by Nasal route daily      polyethylene glycol (GLYCOLAX) 17 GM/SCOOP powder Take 17 g by mouth daily as needed      senna (SENOKOT) 8.6 MG tablet Take 8.6 mg by mouth 2 times daily       No current facility-administered medications for this visit. Orders Placed This Encounter   Procedures    Culture, Urine     Standing Status:   Future     Standing Expiration Date:   12/5/2023     Order Specific Question:   Specify (ex-cath, midstream, cysto, etc)? Answer:   midstream    AMB POC URINALYSIS DIP STICK AUTO W/O MICRO       1. Weakness  Discussed with her son again the need for possible home health. He does not feel this is necessary yet. We will recheck labs due to her chronic weakness and advanced age. She has no new symptoms and appears well, albeit hard of hearing and with decreased vision today. We discussed using assistive devices and continuing to maintain proper nutrition. We will recheck her urine. Has upcoming follow-up with Dr. Carroll Christian. Knows to call for any new or worsening symptoms.  - CBC with Auto Differential; Future  - Basic Metabolic Panel; Future  - Basic Metabolic Panel  - CBC with Auto Differential    2. Urinary frequency  - Culture, Urine;  Future  - AMB POC URINALYSIS DIP STICK AUTO W/O MICRO    3. Elevated blood-pressure reading without diagnosis of hypertension  Recheck of her blood pressure was normal.      Shamika Leung NP, APRN - CNP

## 2022-12-06 ENCOUNTER — TELEPHONE (OUTPATIENT)
Dept: INTERNAL MEDICINE CLINIC | Facility: CLINIC | Age: 87
End: 2022-12-06

## 2022-12-06 DIAGNOSIS — D72.810 LYMPHOPENIA: Primary | ICD-10-CM

## 2022-12-06 DIAGNOSIS — R35.0 URINARY FREQUENCY: ICD-10-CM

## 2022-12-06 DIAGNOSIS — N30.90 CYSTITIS: Primary | ICD-10-CM

## 2022-12-06 DIAGNOSIS — E86.0 DEHYDRATION: ICD-10-CM

## 2022-12-06 LAB
BILIRUBIN, URINE, POC: NEGATIVE
BLOOD URINE, POC: ABNORMAL
GLUCOSE URINE, POC: NEGATIVE
KETONES, URINE, POC: NEGATIVE
LEUKOCYTE ESTERASE, URINE, POC: NEGATIVE
NITRITE, URINE, POC: ABNORMAL
PH, URINE, POC: 5 (ref 4.6–8)
PROTEIN,URINE, POC: NEGATIVE
SPECIFIC GRAVITY, URINE, POC: 1.02 (ref 1–1.03)
URINALYSIS CLARITY, POC: ABNORMAL
URINALYSIS COLOR, POC: YELLOW
UROBILINOGEN, POC: 0.2

## 2022-12-06 RX ORDER — NITROFURANTOIN 25; 75 MG/1; MG/1
100 CAPSULE ORAL 2 TIMES DAILY
Qty: 10 CAPSULE | Refills: 0 | Status: SHIPPED | OUTPATIENT
Start: 2022-12-06 | End: 2022-12-11

## 2022-12-06 NOTE — TELEPHONE ENCOUNTER
Please let Ms. Kyle's son know that her dehydration is mild and likely chronic for her, nothing else to do except make sure she is drinking fluids, just 6 glasses of liquid daily is likely enough for her. Her kidney function is great. I just want to monitor closely.    Stephani

## 2022-12-06 NOTE — TELEPHONE ENCOUNTER
I spoke with her son and he states that they drinks water all day. He wants to know if there is anything else she can do to increase hydration.

## 2022-12-06 NOTE — TELEPHONE ENCOUNTER
Ms. Vega Alex call her son)-  The labs show mild dehydration, normal kidney and liver functions and a low white blood count. These are non specific findings, and I would just like to keep a close eye on this. Stay hydrated. Let us know if she develops fevers or new or concerning symptoms, and let's recheck in 2 weeks to ensure stability.    Hoping you are doing well-    Stephani

## 2022-12-06 NOTE — TELEPHONE ENCOUNTER
Ms. Nick Gera-  It appears that you may still have a UTI. I have called in another antibiotic for you to take. I will reach out once I get the urine culture.    Stephani

## 2022-12-09 ENCOUNTER — TELEPHONE (OUTPATIENT)
Dept: INTERNAL MEDICINE CLINIC | Facility: CLINIC | Age: 87
End: 2022-12-09

## 2022-12-09 DIAGNOSIS — N30.90 CYSTITIS: Primary | ICD-10-CM

## 2022-12-09 LAB
BACTERIA SPEC CULT: ABNORMAL
SERVICE CMNT-IMP: ABNORMAL

## 2022-12-09 RX ORDER — CIPROFLOXACIN 250 MG/1
250 TABLET, FILM COATED ORAL 2 TIMES DAILY
Qty: 10 TABLET | Refills: 0 | Status: SHIPPED | OUTPATIENT
Start: 2022-12-09 | End: 2022-12-19

## 2022-12-09 NOTE — TELEPHONE ENCOUNTER
Ms. Kyle-(call son)  Your urine culture grew 2 organisms, one which may not have responded to the antibiotic you were placed on. I have called in a different antibiotic. You can stop the Macrobid and take the Cipro twice daily for 5 days. It look like you may have had some mild adverse reactions to other medications in this class, but just nausea and a rash. Since this is a low dose of the medication, I am going to go ahead and prescribe it. Let me know if you do not tolerate. Please let us know if you develop  any new or worsening sx.   Stephani

## 2023-01-10 ENCOUNTER — OFFICE VISIT (OUTPATIENT)
Dept: INTERNAL MEDICINE CLINIC | Facility: CLINIC | Age: 88
End: 2023-01-10
Payer: MEDICARE

## 2023-01-10 DIAGNOSIS — N18.30 STAGE 3 CHRONIC KIDNEY DISEASE, UNSPECIFIED WHETHER STAGE 3A OR 3B CKD (HCC): ICD-10-CM

## 2023-01-10 DIAGNOSIS — N30.90 CYSTITIS: ICD-10-CM

## 2023-01-10 DIAGNOSIS — R05.9 COUGH, UNSPECIFIED TYPE: Primary | ICD-10-CM

## 2023-01-10 DIAGNOSIS — R42 DIZZINESS: ICD-10-CM

## 2023-01-10 LAB
EXP DATE SOLUTION: NORMAL
EXP DATE SWAB: NORMAL
EXPIRATION DATE: NORMAL
INFLUENZA A ANTIGEN, POC: NEGATIVE
INFLUENZA B ANTIGEN, POC: NEGATIVE
LOT NUMBER POC: NORMAL
LOT NUMBER SOLUTION: NORMAL
LOT NUMBER SWAB: NORMAL
SARS-COV-2 RNA, POC: NEGATIVE
VALID INTERNAL CONTROL, POC: YES

## 2023-01-10 PROCEDURE — 87804 INFLUENZA ASSAY W/OPTIC: CPT | Performed by: INTERNAL MEDICINE

## 2023-01-10 PROCEDURE — 99441 PR PHYS/QHP TELEPHONE EVALUATION 5-10 MIN: CPT | Performed by: INTERNAL MEDICINE

## 2023-01-10 PROCEDURE — 87635 SARS-COV-2 COVID-19 AMP PRB: CPT | Performed by: INTERNAL MEDICINE

## 2023-01-10 ASSESSMENT — ENCOUNTER SYMPTOMS: COUGH: 1

## 2023-01-10 NOTE — PROGRESS NOTES
Mandi Mcdonough is a 80 y.o. female evaluated via telephone on 1/10/2023. Consent:  She and/or health care decision maker is aware that that she may receive a bill for this telephone service, depending on her insurance coverage, and has provided verbal consent to proceed: 24/7 assist/supervision      Documentation:  I communicated with the patient and/or health care decision maker about the below. Details of this discussion including any medical advice provided:   Patient was supposed to be seen for 6-month follow-up. Due to cough and congestion, viral testing was done. There was a misunderstanding and the patient left before she could be seen. She was also supposed to have a repeat urinalysis secondary to UTI which caused some mental confusion recently. Her son notes that she is some better. Still has moments of confusion but not as severe. Review of Systems   Constitutional:  Negative for chills and fever. HENT:  Positive for congestion. Respiratory:  Positive for cough. Musculoskeletal:  Positive for arthralgias. Psychiatric/Behavioral:  Negative for confusion. Orders Placed This Encounter   Procedures    AMB POC COVID-19 COV     Order Specific Question:   Is this test for diagnosis or screening? Answer:   Diagnosis of ill patient     Order Specific Question:   Symptomatic for COVID-19 as defined by CDC? Answer:   Unknown     Order Specific Question:   Date of Symptom Onset     Answer:   N/A     Order Specific Question:   Hospitalized for COVID-19? Answer:   No     Order Specific Question:   Admitted to ICU for COVID-19? Answer:   No     Order Specific Question:   Employed in healthcare setting? Answer:   No     Order Specific Question:   Resident in a congregate (group) care setting? Answer:   Unknown     Order Specific Question:   Pregnant? Answer:   No     Order Specific Question:   Previously tested for COVID-19?      Answer:   Unknown    AMB POC RAPID INFLUENZA TEST      Diagnosis Orders   1. Cough, unspecified type  AMB POC COVID-19 COV    AMB POC RAPID INFLUENZA TEST      2. Dizziness        3. Stage 3 chronic kidney disease, unspecified whether stage 3a or 3b CKD ([facility] [address]        4. Cystitis          Knows to keep a low threshold for contacting the office with worsening symptoms. Yair Gold anticipate bringing her to the office if she becomes more confused again or else  a cup and get a urinalysis at home. Follow-up as above or earlier if needed. Follow-up and Dispositions    Return in about 3 months (around 4/10/2023). Return in about 3 months (around 4/10/2023). I affirm this is a Patient Initiated Episode with an Established Patient who has not had a related appointment within my department in the past 7 days or scheduled within the next 24 hours.     Total Time: 5-10 minutes    Note: not billable if this call serves to triage the patient into an appointment for the relevant concern      David Freeman MD

## 2023-02-02 ENCOUNTER — OFFICE VISIT (OUTPATIENT)
Dept: INTERNAL MEDICINE CLINIC | Facility: CLINIC | Age: 88
End: 2023-02-02

## 2023-02-02 VITALS
SYSTOLIC BLOOD PRESSURE: 157 MMHG | HEIGHT: 64 IN | TEMPERATURE: 97.9 F | WEIGHT: 94 LBS | HEART RATE: 75 BPM | BODY MASS INDEX: 16.05 KG/M2 | RESPIRATION RATE: 17 BRPM | OXYGEN SATURATION: 99 % | DIASTOLIC BLOOD PRESSURE: 62 MMHG

## 2023-02-02 DIAGNOSIS — I10 PRIMARY HYPERTENSION: ICD-10-CM

## 2023-02-02 DIAGNOSIS — I49.9 IRREGULAR HEART RATE: ICD-10-CM

## 2023-02-02 DIAGNOSIS — M41.9 KYPHOSCOLIOSIS: ICD-10-CM

## 2023-02-02 DIAGNOSIS — Z02.89 ENCOUNTER FOR COMPLETION OF FORM WITH PATIENT: Primary | ICD-10-CM

## 2023-02-02 DIAGNOSIS — R26.81 UNSTEADY GAIT WHEN WALKING: ICD-10-CM

## 2023-02-02 SDOH — ECONOMIC STABILITY: HOUSING INSECURITY
IN THE LAST 12 MONTHS, WAS THERE A TIME WHEN YOU DID NOT HAVE A STEADY PLACE TO SLEEP OR SLEPT IN A SHELTER (INCLUDING NOW)?: PATIENT REFUSED

## 2023-02-02 SDOH — ECONOMIC STABILITY: INCOME INSECURITY: HOW HARD IS IT FOR YOU TO PAY FOR THE VERY BASICS LIKE FOOD, HOUSING, MEDICAL CARE, AND HEATING?: PATIENT DECLINED

## 2023-02-02 SDOH — ECONOMIC STABILITY: FOOD INSECURITY: WITHIN THE PAST 12 MONTHS, YOU WORRIED THAT YOUR FOOD WOULD RUN OUT BEFORE YOU GOT MONEY TO BUY MORE.: PATIENT DECLINED

## 2023-02-02 SDOH — ECONOMIC STABILITY: FOOD INSECURITY: WITHIN THE PAST 12 MONTHS, THE FOOD YOU BOUGHT JUST DIDN'T LAST AND YOU DIDN'T HAVE MONEY TO GET MORE.: PATIENT DECLINED

## 2023-02-02 ASSESSMENT — ENCOUNTER SYMPTOMS
NAUSEA: 0
SHORTNESS OF BREATH: 0
DIARRHEA: 0
CONSTIPATION: 0
VOMITING: 0
COUGH: 1

## 2023-02-02 NOTE — PROGRESS NOTES
2/2/2023 3:19 PM  Location:[facility] [address] [place]NAL MEDICINE  [place]  Patient #:  [id number]  YOB: 1925          YOUR LAST HEMOGLOBIN A1CS:   No results found for: HBA1C, [id number]    YOUR LAST LIPID PROFILE:   Lab Results   Component Value Date/Time    CHOL 211 10/27/2020 11:02 AM    HDL 79 10/27/2020 11:02 AM    VLDL 15 10/27/2020 11:02 AM         Lab Results   Component Value Date/Time    GFRAA 53 07/05/2022 03:45 PM    BUN 29 12/05/2022 10:17 AM     12/05/2022 10:17 AM    K 4.2 12/05/2022 10:17 AM     12/05/2022 10:17 AM    CO2 27 12/05/2022 10:17 AM           History of Present Illness     Chief Complaint   Patient presents with    Forms     Needs forms filled out for [place]       Ms. Albaro Carlin is a 80 y.o. female  who presents for evaluation for VA benefits, needs forms completed. Ms. Albaro Carlin presents with her son today for needing forms filled out. Her  was in the [employer]  and her son is trying to get forms filled out so that she can get his VA benefits. He reports that she is sleeping well and eating well. She has urinary incontinence and uses a walker, has an unsteady gait. There have been no recent falls. She was recently evaluated for cough. There have been no chills, fevers and she has not complained of any chest pain, palpitations, shortness of breath or leg swelling. Her history is significant for hypertension, neuropathy, kyphoscoliosis, osteoarthritis, CKD 3. She lives with her son who manages her medications. She currently still performs her own ADLs. Her son make sure she eats and she is eating well.          Allergies   Allergen Reactions    Levofloxacin Nausea Only and Rash     Past Medical History:   Diagnosis Date    Arthritis     Asymptomatic postmenopausal status (age-related) (natural)     Atrophy of vulva 6/10/2015    Backache, unspecified 6/10/2015    Cancer ([address]     Skin CA removed, x2    Cardiac dysrhythmia, unspecified Compression fracture 6/10/2015    Hypertension     OA (osteoarthritis) 6/10/2015    Osteoporosis     Other vitamin B12 deficiency anemia     Pain in joint, shoulder region 6/10/2015    Sacroiliitis, not elsewhere classified ([facility] 6/10/2015    Torticollis, unspecified 6/10/2015    Underweight     Unspecified constipation 6/10/2015    Unspecified hereditary and idiopathic peripheral neuropathy 6/10/2015    UTI (urinary tract infection)     Vitamin D deficiency      Social History     Socioeconomic History    Marital status:      Spouse name: None    Number of children: None    Years of education: None    Highest education level: None   Tobacco Use    Smoking status: Never    Smokeless tobacco: Never   Substance and Sexual Activity    Alcohol use: No    Drug use: No     Social Determinants of Health     Financial Resource Strain: Unknown    Difficulty of Paying Living Expenses: Patient refused   Food Insecurity: Unknown    Worried About Running Out of Food in the Last Year: Patient refused    [address] in the Last Year: Patient refused   Transportation Needs: Unknown    Lack of Transportation (Non-Medical): Patient refused   Housing Stability: Unknown    Unstable Housing in the Last Year: Patient refused     Past Surgical History:   Procedure Laterality Date    [address] Bilateral 2004, 2005    [address] Right 05/30/14    Dr. Quincy Gregory    HYSTERECTOMY ([address])  1965    Vaginal- for fibroids- on HRT for only a short period of time.     MALIGNANT SKIN LESION EXCISION Left 1958    Leg- Melanoma    MALIGNANT SKIN LESION EXCISION Right 2008    TOTAL HIP ARTHROPLASTY Right 05/03/2014     Current Outpatient Medications   Medication Sig Dispense Refill    calcium carbonate 600 MG TABS tablet Take 1 tablet by mouth daily      meclizine (ANTIVERT) 25 MG tablet TAKE 1 TABLET BY MOUTH THREE TIMES DAILY AS NEEDED FOR DIZZINESS 90 tablet 3 acetaminophen (TYLENOL) 500 MG tablet Take 1,000 mg by mouth 2 times daily as needed      vitamin D (CHOLECALCIFEROL) 39493 UNIT CAPS Take 50,000 Units by mouth daily      cyanocobalamin 1000 MCG tablet Take 1,000 mcg by mouth daily      famotidine (PEPCID) 20 MG tablet 1 every day - twice daily prn reflux      fluticasone (FLONASE) 50 MCG/ACT nasal spray 2 sprays by Nasal route daily      polyethylene glycol (GLYCOLAX) 17 GM/SCOOP powder Take 17 g by mouth daily as needed      senna (SENOKOT) 8.6 MG tablet Take 8.6 mg by mouth 2 times daily       No current facility-administered medications for this visit. Health Maintenance   Topic Date Due    COVID-19 Vaccine (1) Never done    Shingles vaccine (1 of 2) Never done    DEXA (modify frequency per FRAX score)  09/25/2020    Annual Wellness Visit (AWV)  Never done    Flu vaccine (1) 08/01/2022    Depression Screen  07/05/2023    DTaP/Tdap/Td vaccine (2 - Td or Tdap) 10/27/2027    Pneumococcal 65+ years Vaccine  Completed    Hepatitis A vaccine  Aged Out    Hib vaccine  Aged Out    Meningococcal (ACWY) vaccine  Aged Out     Family History   Problem Relation Age of Onset    Hypertension Sister     Stroke Sister     Heart Disease Father     Stroke Mother     Stroke Brother     Diabetes Child     Diabetes Child     Cancer Son         Prostate    High Cholesterol Sister              Review of Systems  Review of Systems   Constitutional:  Negative for appetite change, chills, fever and unexpected weight change. Respiratory:  Positive for cough. Negative for shortness of breath. Gastrointestinal:  Negative for constipation, diarrhea, nausea and vomiting. Genitourinary:         Is incontinent of urine   Musculoskeletal:  Positive for gait problem. Psychiatric/Behavioral:  Positive for confusion (at times gets confused, not new). All other systems reviewed and are negative.     BP (!) 157/62 (Site: Left Upper Arm, Position: Sitting, Cuff Size: Large Adult) Pulse 75   Temp 97.9 °F (36.6 °C) (Skin)   Resp 17   Ht 5' 3.5\" (1.613 m)   Wt 94 lb (42.6 kg)   SpO2 99%   BMI 16.39 kg/m²       Physical Exam    Physical Exam  Vitals and nursing note reviewed. Constitutional:       General: She is not in acute distress. Appearance: She is not ill-appearing (alert, [place], pleasant, smiling), toxic-appearing or diaphoretic. HENT:      Mouth/Throat:      Mouth: Mucous membranes are moist.   Cardiovascular:      Rate and Rhythm: Regular rhythm. FrequentExtrasystoles are present. Pulmonary:      Effort: No respiratory distress. Breath sounds: No wheezing, rhonchi or rales. Musculoskeletal:      Right lower leg: No edema. Left lower leg: No edema. Neurological:      Mental Status: She is oriented to person, place, and time. Assessment & Plan      Current Outpatient Medications   Medication Sig Dispense Refill    calcium carbonate 600 MG TABS tablet Take 1 tablet by mouth daily      meclizine (ANTIVERT) 25 MG tablet TAKE 1 TABLET BY MOUTH THREE TIMES DAILY AS NEEDED FOR DIZZINESS 90 tablet 3    acetaminophen (TYLENOL) 500 MG tablet Take 1,000 mg by mouth 2 times daily as needed      vitamin D (CHOLECALCIFEROL) 55125 UNIT CAPS Take 50,000 Units by mouth daily      cyanocobalamin 1000 MCG tablet Take 1,000 mcg by mouth daily      famotidine (PEPCID) 20 MG tablet 1 every day - twice daily prn reflux      fluticasone (FLONASE) 50 MCG/ACT nasal spray 2 sprays by Nasal route daily      polyethylene glycol (GLYCOLAX) 17 GM/SCOOP powder Take 17 g by mouth daily as needed      senna (SENOKOT) 8.6 MG tablet Take 8.6 mg by mouth 2 times daily       No current facility-administered medications for this visit. 1. Encounter for completion of form with patient  Form completed with son's assistance for VA benefits. He was given the form and will take it to the [place] office. 2. Irregular heart rate  Noted to have some irregular heart rate, apparently PVCs.   EKG reviewed with Dr. Barb Alvarez. As patient was unable to be seen in the office recently for her routine evaluation, will check lab work. She does have follow-up with Dr. Asha Davis in April. Seems to be doing well, is eating well and sleeping well with no new concerning symptoms voiced by son. I have discussed with him before getting assistance in the home with home health and he does not feel that this is necessary yet. Urged to reach out if he feels that this is changed in any way. - EKG 12 Lead; Future  EKG reviewed by myself and Burton  Normal Sr  Rate normal  Occasional PVC  QRS wide, right BBB  No ST elevation  [id number]      - Comprehensive Metabolic Panel; Future  - CBC with Auto Differential; Future  - Magnesium; Future  - TSH with Reflex; Future  - TSH with Reflex  - Magnesium  - CBC with Auto Differential  - Comprehensive Metabolic Panel  - EKG 12 Lead    3. Primary hypertension  On no agents, continue to monitor. Stable at this time. Allow mild permissive hypertension with her age to avoid postural hypotension. 4. Unsteady gait when walking  Using a walker, information provided on preventing falls.     5. Kyphoscoliosis            Jagruti Wilde, ROSENDA, APRN - CNP

## 2023-02-03 ENCOUNTER — TELEPHONE (OUTPATIENT)
Dept: INTERNAL MEDICINE CLINIC | Facility: CLINIC | Age: 88
End: 2023-02-03

## 2023-02-03 LAB
ALBUMIN SERPL-MCNC: 3.6 G/DL (ref 3.2–4.6)
ALBUMIN/GLOB SERPL: 1 (ref 0.4–1.6)
ALP SERPL-CCNC: 111 U/L (ref 50–136)
ALT SERPL-CCNC: 23 U/L (ref 12–65)
ANION GAP SERPL CALC-SCNC: 8 MMOL/L (ref 2–11)
AST SERPL-CCNC: 29 U/L (ref 15–37)
BASOPHILS # BLD: 0 K/UL (ref 0–0.2)
BASOPHILS NFR BLD: 0 % (ref 0–2)
BILIRUB SERPL-MCNC: 0.8 MG/DL (ref 0.2–1.1)
BUN SERPL-MCNC: 28 MG/DL (ref 8–23)
CALCIUM SERPL-MCNC: 9.8 MG/DL (ref 8.3–10.4)
CHLORIDE SERPL-SCNC: 104 MMOL/L (ref 101–110)
CO2 SERPL-SCNC: 27 MMOL/L (ref 21–32)
CREAT SERPL-MCNC: 1.2 MG/DL (ref 0.6–1)
DIFFERENTIAL METHOD BLD: ABNORMAL
EOSINOPHIL # BLD: 0 K/UL (ref 0–0.8)
EOSINOPHIL NFR BLD: 1 % (ref 0.5–7.8)
ERYTHROCYTE [DISTWIDTH] IN BLOOD BY AUTOMATED COUNT: 12.5 % (ref 11.9–14.6)
GLOBULIN SER CALC-MCNC: 3.5 G/DL (ref 2.8–4.5)
GLUCOSE SERPL-MCNC: 186 MG/DL (ref 65–100)
HCT VFR BLD AUTO: 36.2 % (ref 35.8–46.3)
HGB BLD-MCNC: 11.7 G/DL (ref 11.7–15.4)
IMM GRANULOCYTES # BLD AUTO: 0 K/UL (ref 0–0.5)
IMM GRANULOCYTES NFR BLD AUTO: 0 % (ref 0–5)
LYMPHOCYTES # BLD: 0.9 K/UL (ref 0.5–4.6)
LYMPHOCYTES NFR BLD: 17 % (ref 13–44)
MAGNESIUM SERPL-MCNC: 2.2 MG/DL (ref 1.8–2.4)
MCH RBC QN AUTO: 32.4 PG (ref 26.1–32.9)
MCHC RBC AUTO-ENTMCNC: 32.3 G/DL (ref 31.4–35)
MCV RBC AUTO: 100.3 FL (ref 82–102)
MONOCYTES # BLD: 0.4 K/UL (ref 0.1–1.3)
MONOCYTES NFR BLD: 8 % (ref 4–12)
NEUTS SEG # BLD: 3.8 K/UL (ref 1.7–8.2)
NEUTS SEG NFR BLD: 74 % (ref 43–78)
NRBC # BLD: 0 K/UL (ref 0–0.2)
PLATELET # BLD AUTO: 192 K/UL (ref 150–450)
PMV BLD AUTO: 11.6 FL (ref 9.4–12.3)
POTASSIUM SERPL-SCNC: 3.8 MMOL/L (ref 3.5–5.1)
PROT SERPL-MCNC: 7.1 G/DL (ref 6.3–8.2)
RBC # BLD AUTO: 3.61 M/UL (ref 4.05–5.2)
SODIUM SERPL-SCNC: 139 MMOL/L (ref 133–143)
TSH W FREE THYROID IF ABNORMAL: 1.65 UIU/ML (ref 0.36–3.74)
WBC # BLD AUTO: 5.2 K/UL (ref 4.3–11.1)

## 2023-02-03 NOTE — TELEPHONE ENCOUNTER
Please let Ms. Kyle and her son know that her labs that we did yesterday are all stable.   Hoping she is doing well, here if you need us!  Stephani

## 2023-03-02 DIAGNOSIS — F41.9 ANXIETY: ICD-10-CM

## 2023-03-02 RX ORDER — LORAZEPAM 1 MG/1
1 TABLET ORAL 2 TIMES DAILY PRN
Qty: 60 TABLET | Refills: 5 | Status: SHIPPED | OUTPATIENT
Start: 2023-03-02 | End: 2023-08-29

## 2023-03-24 RX ORDER — FAMOTIDINE 20 MG/1
TABLET, FILM COATED ORAL
Qty: 180 TABLET | Refills: 1 | Status: SHIPPED | OUTPATIENT
Start: 2023-03-24

## 2023-03-28 RX ORDER — MECLIZINE HYDROCHLORIDE 25 MG/1
TABLET ORAL
Qty: 90 TABLET | Refills: 3 | Status: SHIPPED | OUTPATIENT
Start: 2023-03-28

## 2023-07-17 RX ORDER — FAMOTIDINE 20 MG/1
TABLET, FILM COATED ORAL
Qty: 180 TABLET | Refills: 1 | Status: SHIPPED | OUTPATIENT
Start: 2023-07-17

## 2023-09-15 RX ORDER — MECLIZINE HYDROCHLORIDE 25 MG/1
TABLET ORAL
Qty: 90 TABLET | Refills: 3 | Status: SHIPPED | OUTPATIENT
Start: 2023-09-15

## 2024-03-11 ENCOUNTER — TELEPHONE (OUTPATIENT)
Dept: INTERNAL MEDICINE CLINIC | Facility: CLINIC | Age: 89
End: 2024-03-11

## 2024-03-11 NOTE — TELEPHONE ENCOUNTER
Mr. Kyle was calling back for a missed call this morning. I didn't see a  note and don't know who might of called.    Universal Safety Interventions